# Patient Record
Sex: MALE | URBAN - METROPOLITAN AREA
[De-identification: names, ages, dates, MRNs, and addresses within clinical notes are randomized per-mention and may not be internally consistent; named-entity substitution may affect disease eponyms.]

---

## 2020-03-15 ENCOUNTER — VIRTUAL VISIT (OUTPATIENT)
Dept: FAMILY MEDICINE | Facility: OTHER | Age: 30
End: 2020-03-15

## 2020-03-16 NOTE — PROGRESS NOTES
"Date: 03/15/2020 12:08:43  Clinician: Jorge Murphy  Clinician NPI: [id number]  Patient: Abraham Patrick  Patient : 1990  Patient Address: [address] [place] [address]  Patient Phone: [phone]  Visit Protocol: URI  Patient Summary:  Abraham is a 29 year old ( : 1990 ) male who initiated a Visit for cold, sinus infection, or influenza. When asked the question \"Please sign me up to receive news, health information and promotions. \", Abraham responded \"No\".    Abraham states his symptoms started 1-2 days ago.   His symptoms consist of malaise, rhinitis, facial pain or pressure, a cough, and nasal congestion.   Symptom details     Nasal secretions: The color of his mucus is white.    Cough: Abraham coughs a few times an hour and his cough is not more bothersome at night. Phlegm comes into his throat when he coughs. He does not believe his cough is caused by post-nasal drip. The color of the phlegm is white and yellow.     Facial pain or pressure: The facial pain or pressure does not feel worse when bending or leaning forward.      Abraham denies having ear pain, fever, headache, enlarged lymph nodes, myalgias, chills, wheezing, sore throat, and teeth pain. He also denies having recent facial or sinus surgery in the past 60 days. He is not experiencing dyspnea.   Precipitating events  He has not recently been exposed to someone with influenza. Abraham has been in close contact with the following high risk individuals: people with asthma, heart disease or diabetes.   Pertinent COVID-19 (Coronavirus) information  Abraham has not traveled internationally or to the areas where COVID-19 (Coronavirus) is widespread in the last 14 days before the start of his symptoms.   Abraham has not had close contact with a suspected or laboratory-confirmed COVID-19 patient within 14 days of symptom onset.   Abraham is not a healthcare worker and does not work in a healthcare facility.   Pertinent medical history  Abraham has " taken an antibiotic medication in the past month. Antibiotic details as reported by the patient (free text): Amoxycillin   Abraham does not need a return to work/school note.   Weight: 210 lbs   Abraham does not smoke or use smokeless tobacco.   Additional information as reported by the patient (free text): Feeling better than I did a couple days ago   Weight: 210 lbs    MEDICATIONS: Mucinex oral, ALLERGIES: NKDA  Clinician Response:  Dear Abraham,   Dear Abraham,  Based on the information you have provided, it does not appear you need Coronavirus (COVID-19) testing.   It does sound as though you have a viral URI and recommend symptomatic treatment with tylenol/ibuprofen as needed for fever/chills/aches, decongestants, cough suppressants. &nbsp;Stay hydrated and rest &nbsp;Do not return to work if feverish, must be fever free for 24 hours.&nbsp;  At this time, we recommend testing primarily for those people who have symptoms of cough and fever and have either traveled to a known area of infection or have been exposed to someone with laboratory confirmed Coronavirus by close contact.   Coronavirus - General Information:   The coronavirus infection starts within 14 days of an exposure.  Symptoms are those of a respiratory infection (such as fever, cough).   If you have not had symptoms by day 15, you should be considered uninfected by coronavirus.   Coronavirus - Symptoms:    The coronavirus can cause a respiratory illness, such as bronchitis or pneumonia.  The most common symptoms are: cough, fever, and shortness of breath.   Other symptoms are: body aches, chills, diarrhea, fatigue, headache, runny nose, and sore throat   Coronavirus - Exposure Risk Factors:   Exposure to a person who has been diagnosed with coronavirus.  Travel from an area with recent local transmission of coronavirus.  The CDC (www.cdc.gov) has the most up-to-date list of where the coronavirus outbreak is occurring.   Coronavirus - Spreading:    The  virus likely spreads through respiratory droplets produced when a person coughs or sneezes. These respiratory droplets can travel approximately 6 feet and can remain on surfaces. Common disinfectants will kill the virus.  The CDC currently does not recommend healthy people wear masks.   Coronavirus - Protect Yourself:    Avoid close contact with people known to have this new coronavirus infection.  Wash hands often with soap and water or alcohol-based hand .  Avoid touching the eyes, nose or mouth.   Thank you for limiting contact with others, wearing a simple mask to cover your cough, practice good hand hygiene habits and accessing our virtual services where possible to limit the spread of this virus.  For more information about COVID19 and options for caring for yourself at home, please visit the CDC website at https://www.cdc.gov/coronavirus/2019-ncov/about/steps-when-sick.html   For more options for care at [facility], please visit our website at [email or web address]     Diagnosis: Acute upper respiratory infection, unspecified  Diagnosis ICD: J06.9

## 2020-09-20 ENCOUNTER — VIRTUAL VISIT (OUTPATIENT)
Dept: FAMILY MEDICINE | Facility: OTHER | Age: 30
End: 2020-09-20

## 2020-09-20 NOTE — PROGRESS NOTES
"Date: 2020 12:09:32  Clinician: Shelbie Lombardi  Clinician NPI: [id number]  Patient: Abraham Patrick  Patient : 1990  Patient Address: [address] [place] [address]  Patient Phone: [phone]  Visit Protocol: URI  Patient Summary:  Abraham is a 30 year old ( : 1990 ) male who initiated a [facility] Visit for COVID-19 (Coronavirus) evaluation and screening. When asked the question \"Please sign me up to receive news, health information and promotions. \", Abraham responded \"No\".    When asked when his symptoms started, Abraham reported that he does not have any symptoms.   He denies taking antibiotic medication in the past month and having recent facial or sinus surgery in the past 60 days.    Pertinent COVID-19 (Coronavirus) information  In the past 14 days, Abraham has not worked in a congregate living setting.   He does not work or volunteer as healthcare worker or a  and does not work or volunteer in a healthcare facility.   Abraham also has not lived in a congregate living setting in the past 14 days. He does not live with a healthcare worker.   Abraham has not had a close contact with a laboratory-confirmed COVID-19 patient within the last 14 days.   Since 2019, Abraham and has not had upper respiratory infection or influenza-like illness. Has not been diagnosed with lab-confirmed COVID-19 test   Pertinent medical history  Abraham does not need a return to work/school note.   Weight: 210 lbs   Abraham does not smoke or use smokeless tobacco.   Weight: 210 lbs    MEDICATIONS: No current medications, ALLERGIES: NKDA  Clinician Response:  Dear Abraham,   Based on the details you've shared, you are concerned about contact with someone who may have been exposed to coronavirus (COVID-19). Based on [facility] guidelines you do not need to be tested at this time.  Testing for people who do not have symptoms is only required in certain instances, including direct contact with a " person who has tested positive for COVID-19, or for those who are traveling to locations where testing is required beforehand.  Please redo an [facility] visit or call your clinic if you think we missed needed information, your exposure information has changed, or you develop symptoms.  What are the symptoms of COVID-19?  The most common symptoms are cough, fever and trouble breathing. Less common symptoms include headache, body aches, fatigue (feeling very tired), chills, sore throat, stuffy or runny nose, diarrhea (loose poop), loss of taste or smell, belly pain, and nausea or vomiting (feeling sick to your stomach or throwing up).  Where can I get more information?  [facility] -- About COVID-19: [email or web address]  CDC -- What to Do If You're Sick: www.cdc.gov/coronavirus/2019-ncov/about/steps-when-sick.html  CDC -- Ending Home Isolation: www.cdc.gov/coronavirus/2019-ncov/hcp/disposition-in-home-patients.html  CDC -- Caring for Someone: www.cdc.gov/coronavirus/2019-ncov/if-you-are-sick/care-for-someone.html  [facility] -- Interim Guidance for Hospital Discharge to Home: www.health.[facility].mn.us/diseases/coronavirus/hcp/hospdischarge.pdf  [facility] clinical trials (COVID-19 research studies): [email or web address]  Below are the COVID-19 hotlines at the [facility] of Health ([facility]). Interpreters are available.  For health questions: Call 153-440-4930 or 1-484.814.9842 (7 a.m. to 7 p.m.)  For questions about schools and childcare: Call [phone] or 1-277.336.1290 (7 a.m. to 7 p.m.)    Diagnosis: Worries  Diagnosis ICD: R45.82